# Patient Record
Sex: MALE | Race: WHITE
[De-identification: names, ages, dates, MRNs, and addresses within clinical notes are randomized per-mention and may not be internally consistent; named-entity substitution may affect disease eponyms.]

---

## 2017-10-06 ENCOUNTER — HOSPITAL ENCOUNTER (OUTPATIENT)
Dept: HOSPITAL 17 - HEND | Age: 62
End: 2017-10-06
Attending: INTERNAL MEDICINE
Payer: MEDICARE

## 2017-10-06 VITALS — WEIGHT: 197.98 LBS | BODY MASS INDEX: 31.82 KG/M2 | HEIGHT: 66 IN

## 2017-10-06 VITALS
DIASTOLIC BLOOD PRESSURE: 79 MMHG | HEART RATE: 99 BPM | SYSTOLIC BLOOD PRESSURE: 147 MMHG | OXYGEN SATURATION: 94 % | RESPIRATION RATE: 18 BRPM | TEMPERATURE: 97.8 F

## 2017-10-06 DIAGNOSIS — I10: ICD-10-CM

## 2017-10-06 DIAGNOSIS — E11.9: ICD-10-CM

## 2017-10-06 DIAGNOSIS — K80.50: Primary | ICD-10-CM

## 2017-10-06 DIAGNOSIS — J44.9: ICD-10-CM

## 2017-10-06 DIAGNOSIS — K74.60: ICD-10-CM

## 2017-10-06 DIAGNOSIS — Z01.810: ICD-10-CM

## 2017-10-06 PROCEDURE — 93005 ELECTROCARDIOGRAM TRACING: CPT

## 2017-10-06 PROCEDURE — 74330 X-RAY BILE/PANC ENDOSCOPY: CPT

## 2017-10-06 PROCEDURE — C1769 GUIDE WIRE: HCPCS

## 2017-10-06 NOTE — RADRPT
EXAM DATE/TIME:  10/06/2017 15:00 

 

[facility] COMPARISON:     

No previous studies available for comparison.

                     

 

INDICATIONS :     

Obstruction, stones. Sphincterotomy.

                     

 

FLUORO TIME:      

1.85 minutes

 

IMAGE COUNT:       

4

                     

 

CONTRAST:     

Instilled by Ordering Physician

                     

 

MEDICAL HISTORY :            

Unobtainable.   

 

SURGICAL HISTORY :        

Unobtainable.

 

ENCOUNTER:     

Initial                                        

 

ACUITY:     

1 day      

 

PAIN SCORE:     

Non-responsive.

 

LOCATION:       

Abdomen. 

 

FINDINGS:     

An ERCP was performed by the ordering physician.  

 

The images demonstrate dilated common bile duct with stone in the distal duct. Sphincterotomy and rem
oval of stone

 

CONCLUSION:     ERCP as above.

 

 

 

 Bharat Leblanc MD on October 06, 2017 at 15:54           

Board Certified Radiologist.

 This report was verified electronically.

## 2017-10-06 NOTE — GIPROC
[facility]

[address] [facility]. [facility], [address]

 

 

ERCP PROCEDURE REPORT        EXAM DATE: 10/06/2017

 

PATIENT NAME:      James Lind           MR #:      [id number]

YOB: 1955      VISIT #:     [id number]

ATTENDING:     Misael Adamson MD     ORDER #:     [id number]

ASSISTANT:      Allyson Kirk and Rush Mueller     STATUS:     outpatient

 

INDICATIONS:  The patient is a 62 yr old male here for an ERCP due to

choledocholithiasis base on MRI this past March. He has cirrhosis and takes

propranolol, 20mg/coleman.

PROCEDURE PERFORMED:     ERCP/sphincterotomy/balloon pull-through

 

MEDICATIONS:     Per Anesthesia.  general endotracheal intubation.

 

CONSENT: The patient understands the risks and benefits of the procedure and

understands that these risks include, but are not limited to: sedation,

allergic reaction, infection, perforation and/or bleeding. Alternative means of

evaluation and treatment include, among others: physical exam, x-rays, and/or

surgical intervention. The patient elects to proceed with this endoscopic

procedure.

 



medical equipment was checked for proper function. Hand hygiene and appropriate

measures for infection prevention was taken. After the risks, benefits and

alternatives of the procedure were thoroughly explained, Informed was verified,

confirmed and timeout was successfully executed by the treatment team. With the

patient in left semi-prone position, medications were administered

intravenously.The Pentax [id number] was passed from the mouth into the

esophagus and further advanced from the esophagus into the stomach. From

stomach scope was directed to the ampulla of Vater     .  Major papilla was

aligned with the duodenoscope. The scope position was confirmed

fluoroscopically. Rest of the findings/therapeutics are given below. The scope

was then completely withdrawn from the patient and the procedure completed. The

pulse, BP, and O2 saturation were monitored and documented by the physician and

the nursing staff throughout the entire procedure. The patient was cared for as

planned according to standard protocol. The patient was then discharged to

recovery in stable condition and with appropriate post procedure care.

 

The esophagus appeared  normal with no varices.  The Z-line appeared normal at

the GE junction ,at 40cm from the incisors.

Prominent veins were noted in the fundus of the stomach, but none at the cardia.

Superficial punctate antral erosions were noted.

The pylorus, duodenal bulb and sweep appeared normal.

The ampulla bulged a bit.

Cholangiogram revealed a dilated extrahepatic biliary tree (up to 1 6mm) with no

significant intrahepatic dilation    No filling defects were noted.  A 7mm

sphincterotomy was made and a 15mm balloon was inflated in the proximal bile

duct and pulled distally.  The balloonw was partially deflated and withdrawn

via the ampulla.  Good drainage was noted with no stones or sludge extracted.

 

 

 

 

ADVERSE EVENT:     There were no complications.

IMPRESSIONS:     As above

 

RECOMMENDATIONS:     D/C home when anesthesia criteria are met. Resume prior

diet and medications

Follow up with me in 4 weeks with a labs checked prior to visit.

REPEAT EXAM:     Repeat EGD in one year.

 

 

___________________________________

Misael Adamson MD

eSigned:  Misael Adamson MD 10/06/2017 3:22 PM

 

 

cc: Anum Abel M.D.

 

 

 

PATIENT NAME:  James Lind

MR#: [id number]

## 2017-10-07 NOTE — EKG
Date Performed: 10/06/2017       Time Performed: 11:24:36

 

PTAGE:      62 years

 

EKG:      Sinus rhythm 

 

 NORMAL ECG

 

PREVIOUS TRACING       : 11/04/2014 11.32

 

DOCTOR:   Brian Lawler  Interpretating Date/Time  10/07/2017 05:15:06

## 2018-04-25 ENCOUNTER — HOSPITAL ENCOUNTER (EMERGENCY)
Dept: HOSPITAL 17 - PHEFT | Age: 63
Discharge: HOME | End: 2018-04-25
Payer: MEDICARE

## 2018-04-25 VITALS — HEIGHT: 67 IN | BODY MASS INDEX: 31.63 KG/M2 | WEIGHT: 201.5 LBS

## 2018-04-25 VITALS
OXYGEN SATURATION: 94 % | SYSTOLIC BLOOD PRESSURE: 142 MMHG | RESPIRATION RATE: 18 BRPM | DIASTOLIC BLOOD PRESSURE: 96 MMHG | HEART RATE: 94 BPM | TEMPERATURE: 97.7 F

## 2018-04-25 VITALS — DIASTOLIC BLOOD PRESSURE: 89 MMHG | SYSTOLIC BLOOD PRESSURE: 159 MMHG

## 2018-04-25 VITALS — RESPIRATION RATE: 18 BRPM

## 2018-04-25 DIAGNOSIS — Z23: ICD-10-CM

## 2018-04-25 DIAGNOSIS — S61.411A: Primary | ICD-10-CM
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
## 2018-04-25 PROCEDURE — 90714 TD VACC NO PRESV 7 YRS+ IM: CPT

## 2018-04-25 PROCEDURE — 12001 RPR S/N/AX/GEN/TRNK 2.5CM/<: CPT

## 2018-04-25 PROCEDURE — 73130 X-RAY EXAM OF HAND: CPT

## 2018-04-25 PROCEDURE — 90471 IMMUNIZATION ADMIN: CPT

## 2018-04-25 NOTE — RADRPT
EXAM DATE/TIME:  04/25/2018 21:47 

 

[facility] COMPARISON:     

No previous studies available for comparison.

 

                     

INDICATIONS :     

Right hand laceration after saw injury.

                     

 

MEDICAL HISTORY :     

None.          

 

SURGICAL HISTORY :     

None.   

 

ENCOUNTER:     

Initial                                        

 

ACUITY:     

1 day      

 

PAIN SCORE:     

10/10

 

LOCATION:     

Right  hand, 3rd-5th digits.

 

FINDINGS:     

Soft tissue defects are seen of the long, ring and little fingers. No fracture. I also don't see a ra
diopaque foreign body of these digits but a tiny radiopaque foreign body is seen volar soft tissues o
f the pointer finger at the level of the mid to distal proximal phalanx.

 

CONCLUSION:     

No fracture or other acute bony abnormality. Tiny radiopaque debris of the pointer finger soft tissue
s.

 

 

 

 Ned Worrell MD on April 25, 2018 at 22:05           

Board Certified Radiologist.

 This report was verified electronically.

## 2018-04-25 NOTE — PD
HPI


Chief Complaint:  Laceration/Skin Injury


Time Seen by Provider:  21:23


Travel History


International Travel<30 days:  No


Contact w/Intl Traveler<30days:  No


Traveled to known affect area:  No





History of Present Illness


HPI


Is a 63-year-old man presents to the emergency department complaining of 

laceration to the right hand.  Patient is right-handed.  Patient states he was 

trying to put his tools away, and had a circular saw with no guard on it that 

he thought was unplugged when he was trying to put it away the saw kicked on 

and cut his right hand at the base of his fourth and fifth fingers.  He has 

bleeding in that area.  Still has full function.  This happened just prior to 

arrival.  He otherwise had been feeling generally well and healthy.





History


Past Medical History


*** Narrative Medical


Hypertension


Diabetes


On methadone


Influenza Vaccination:  No


PNEUMOCCOCAL Vaccine (Year):  3





Social History


Alcohol Use:  No (6 beers per day)


Tobacco Use:  No (quit)





Allergies-Medications


(Allergen,Severity, Reaction):  


Coded Allergies:  


     acetaminophen (Unverified  Allergy, Severe, CANNOT TAKE DUE TO LIVER 

FAILURE, 4/25/18)


     aspirin (Unverified  Allergy, Severe, CANNOT TAKE - LIVER FAILURE, 4/25/18)


     codeine (Unverified  Allergy, Severe, Hives, 4/25/18)


     ibuprofen (Unverified  Allergy, Severe, CANNOT TAKE DUE TO LIVER FAILURE, 4 /25/18)


     pregabalin (Unverified  Allergy, Severe, Psychosis, 4/25/18)


Reported Meds & Prescriptions





Reported Meds & Active Scripts


Active


Reported


[dibetes med]     


Methadone (Methadone HCl) 10 Mg Tab 30 Mg PO BID


Lasix (Furosemide) 40 Mg Tab 40 Mg PO DAILY


Propranolol (Propranolol HCl) 20 Mg Tab 20 Mg PO DAILY


Adderall (Amphetamine-Dextroamphetamine) 20 Mg Tab 10 Mg PO DAILY


     Avoid late evening doses. Space doses at least 4 to 6 hours if more


     than once/day dosing.








Review of Systems


Except as stated in HPI:  all other systems reviewed are Neg





Physical Exam


Narrative


GENERAL: 63-year-old man, well-appearing, no acute distress.


SKIN: Warm and dry.


CARDIOVASCULAR: Warm and well perfused.


RESPIRATORY: Normal rate and effort.


MUSCULOSKELETAL: Focused examination of the right hand reveals lacerations on 

the volar surface of the base of the third fourth and fifth fingers.  On the 

fifth finger there is multiple on the fourth finger there is a large avulsion 

and a flap with exposed tendon on the proximal phalanx on the same side.  On 

the third digit there are several smaller lacerations on the proximal and mid 

phalanx.  There is full range of motion of the hand.  All the flexor tendons 

appear to be functioning.  There is some loss of soft tissue on the fourth and 

fifth finger.  Distally he has good perfusion.  Sensations intact distally.


NEUROLOGICAL: Awake and alert.  No gross deficits.





Data


Data


Last Documented VS





Vital Signs








  Date Time  Temp Pulse Resp B/P (MAP) Pulse Ox O2 Delivery O2 Flow Rate FiO2


 


4/25/18 21:17 97.7 94 18 142/96 (111) 94   








Orders





 Orders


Hand, Complete ([id number]) (4/25/18 )


Lidocaine 1% Inj (Xylocaine 1% Inj) (4/25/18 21:45)


Bupivacaine Pf 0.25% Inj (Marcaine Pf 0. (4/25/18 21:45)


Morphine Ir (Msir) (4/25/18 21:45)


Tetanus/Diphtheria Tox Adult (Tetanus/Di (4/25/18 21:45)


Cephalexin (Keflex) (4/25/18 21:45)


Bupivacaine Pf 0.25% Inj (Marcaine Pf 0. (4/25/18 22:00)


Lidocaine 1% Inj (Xylocaine 1% Inj) (4/25/18 21:50)








MDM


Medical Decision Making


Medical Screen Exam Complete:  Yes


Emergency Medical Condition:  Yes


Interpretation(s)


X-ray negative for fracture.


Differential Diagnosis


Laceration to the right hand, flexor tendon injury to the right hand, bony 

injury to the right hand, other


Narrative Course


Medical decision making





63-year-old male complex injury right hand from a circular saw.  Some soft 

tissue avulsion on the fourth finger, multiple deep lacerations on the fifth, 

smaller more superficial lacerations on the third.  Will block the fingers, 

check x-ray, tetanus, antibiotics, follow-up with hand surgery.





Procedures


**Procedure Narrative**


Laceration repair:


Finger 3 4 and 5 blocked with lidocaine and quarter percent bupivacaine.  There 

is washout with sterile saline and Betadine.  Multiple sutures were used to 

loosely close the macerated soft tissue.  Patient tolerated well.





Diagnosis





 Primary Impression:  


 Hand laceration


Referrals:  


Christiano Jorgensen MD


2 days





***Additional Instructions:  


Take Keflex as prescribed.





Follow-up with the plastic surgeon as discussed.





Return to the emergency department for any n worsening pain redness or swelling

, or any other new or worsening symptoms.


***Med/Other Pt SpecificInfo:  Prescription(s) given


Scripts


Morphine IR (Morphine IR) 15 Mg Tab


15 MG PO Q6HR Y for PAIN, #20 TAB 0 Refills


   Prov: Last Hahn MD         4/25/18 


Cephalexin (Keflex) 500 Mg Cap


500 MG PO Q8H for Infection, #30 CAP 0 Refills


   Prov: Last Hahn MD         4/25/18


Disposition:  01 DISCHARGE HOME


Condition:  Stable











Last Hahn MD Apr 25, 2018 21:58

## 2018-05-12 ENCOUNTER — HOSPITAL ENCOUNTER (EMERGENCY)
Dept: HOSPITAL 17 - NEPE | Age: 63
LOS: 1 days | Discharge: HOME | End: 2018-05-13
Payer: MEDICARE

## 2018-05-12 VITALS — WEIGHT: 199.41 LBS | BODY MASS INDEX: 29.53 KG/M2 | HEIGHT: 69 IN

## 2018-05-12 VITALS
RESPIRATION RATE: 16 BRPM | HEART RATE: 72 BPM | OXYGEN SATURATION: 95 % | TEMPERATURE: 98 F | DIASTOLIC BLOOD PRESSURE: 101 MMHG | SYSTOLIC BLOOD PRESSURE: 216 MMHG

## 2018-05-12 VITALS
DIASTOLIC BLOOD PRESSURE: 93 MMHG | SYSTOLIC BLOOD PRESSURE: 199 MMHG | HEART RATE: 71 BPM | RESPIRATION RATE: 16 BRPM | OXYGEN SATURATION: 95 %

## 2018-05-12 DIAGNOSIS — F41.8: ICD-10-CM

## 2018-05-12 DIAGNOSIS — Z79.84: ICD-10-CM

## 2018-05-12 DIAGNOSIS — I86.4: ICD-10-CM

## 2018-05-12 DIAGNOSIS — Z87.442: ICD-10-CM

## 2018-05-12 DIAGNOSIS — K70.30: Primary | ICD-10-CM

## 2018-05-12 DIAGNOSIS — B19.20: ICD-10-CM

## 2018-05-12 DIAGNOSIS — Z88.5: ICD-10-CM

## 2018-05-12 DIAGNOSIS — E11.9: ICD-10-CM

## 2018-05-12 DIAGNOSIS — J44.9: ICD-10-CM

## 2018-05-12 DIAGNOSIS — Z88.6: ICD-10-CM

## 2018-05-12 LAB
BASOPHILS # BLD AUTO: 0 TH/MM3 (ref 0–0.2)
BASOPHILS NFR BLD: 0.6 % (ref 0–2)
EOSINOPHIL # BLD: 0.3 TH/MM3 (ref 0–0.4)
EOSINOPHIL NFR BLD: 4 % (ref 0–4)
ERYTHROCYTE [DISTWIDTH] IN BLOOD BY AUTOMATED COUNT: 14.2 % (ref 11.6–17.2)
HCT VFR BLD CALC: 42.4 % (ref 39–51)
HGB BLD-MCNC: 14.5 GM/DL (ref 13–17)
LYMPHOCYTES # BLD AUTO: 1.9 TH/MM3 (ref 1–4.8)
LYMPHOCYTES NFR BLD AUTO: 25.7 % (ref 9–44)
MCH RBC QN AUTO: 33.7 PG (ref 27–34)
MCHC RBC AUTO-ENTMCNC: 34.3 % (ref 32–36)
MCV RBC AUTO: 98.4 FL (ref 80–100)
MONOCYTE #: 1 TH/MM3 (ref 0–0.9)
MONOCYTES NFR BLD: 13.4 % (ref 0–8)
NEUTROPHILS # BLD AUTO: 4.2 TH/MM3 (ref 1.8–7.7)
NEUTROPHILS NFR BLD AUTO: 56.3 % (ref 16–70)
PLATELET # BLD: 205 TH/MM3 (ref 150–450)
PMV BLD AUTO: 8.5 FL (ref 7–11)
RBC # BLD AUTO: 4.31 MIL/MM3 (ref 4.5–5.9)
WBC # BLD AUTO: 7.4 TH/MM3 (ref 4–11)

## 2018-05-12 PROCEDURE — 96360 HYDRATION IV INFUSION INIT: CPT

## 2018-05-12 PROCEDURE — 99285 EMERGENCY DEPT VISIT HI MDM: CPT

## 2018-05-12 PROCEDURE — 80053 COMPREHEN METABOLIC PANEL: CPT

## 2018-05-12 PROCEDURE — 83690 ASSAY OF LIPASE: CPT

## 2018-05-12 PROCEDURE — 85025 COMPLETE CBC W/AUTO DIFF WBC: CPT

## 2018-05-12 PROCEDURE — 74176 CT ABD & PELVIS W/O CONTRAST: CPT

## 2018-05-12 PROCEDURE — 93005 ELECTROCARDIOGRAM TRACING: CPT

## 2018-05-12 PROCEDURE — 83605 ASSAY OF LACTIC ACID: CPT

## 2018-05-13 VITALS — OXYGEN SATURATION: 96 %

## 2018-05-13 LAB
ALBUMIN SERPL-MCNC: 3.1 GM/DL (ref 3.4–5)
ALP SERPL-CCNC: 216 U/L (ref 45–117)
ALT SERPL-CCNC: 23 U/L (ref 12–78)
AST SERPL-CCNC: 39 U/L (ref 15–37)
BILIRUB SERPL-MCNC: 1.2 MG/DL (ref 0.2–1)
BUN SERPL-MCNC: 15 MG/DL (ref 7–18)
CALCIUM SERPL-MCNC: 9.2 MG/DL (ref 8.5–10.1)
CHLORIDE SERPL-SCNC: 106 MEQ/L (ref 98–107)
CREAT SERPL-MCNC: 0.73 MG/DL (ref 0.6–1.3)
GFR SERPLBLD BASED ON 1.73 SQ M-ARVRAT: 108 ML/MIN (ref 89–?)
GLUCOSE SERPL-MCNC: 114 MG/DL (ref 74–106)
HCO3 BLD-SCNC: 27.4 MEQ/L (ref 21–32)
PROT SERPL-MCNC: 8.1 GM/DL (ref 6.4–8.2)
SODIUM SERPL-SCNC: 143 MEQ/L (ref 136–145)

## 2018-05-13 NOTE — EKG
Date Performed: 05/13/2018       Time Performed: 00:24:40

 

PTAGE:      63 years

 

EKG:      Sinus rhythm 

 

 NORMAL ECG

 

PREVIOUS TRACING       : 10/06/2017 11.24 Compared to previous tracing,  rate slower

 

DOCTOR:   Gifty Ruvalcaba  Interpretating Date/Time  05/13/2018 17:10:45

## 2018-05-13 NOTE — PD
HPI


Chief Complaint:  Flank/Kidney Pain


Time Seen by Provider:  23:26


Travel History


International Travel<30 days:  No


Contact w/Intl Traveler<30days:  No


Traveled to known affect area:  No





History of Present Illness


HPI


63-year-old male arrives with complaint of flank pain.  Pain is constant.  No 

vomiting.  Pain is rated to be 9/10.  The pain is worse with palpation.  He has 

a history of kidney stones.  He states the pain is radiating to the anterior 

abdominal wall. No hematuria. Duration < 1 day.





PFSH


Past Medical History


Arthritis:  No


Asthma:  No


Autoimmune Disease:  Yes (hep c)


Anxiety:  Yes


Depression:  Yes


Heart Rhythm Problems:  No


Cancer:  No


Cardiovascular Problems:  Yes


High Cholesterol:  No


Chest Pain:  Yes


Congestive Heart Failure:  No


Cirrhosis:  Yes


COPD:  Yes


Cerebrovascular Accident:  No


Diabetes:  Yes


Patient Takes Glucophage:  No


Diminished Hearing:  No


Endocrine:  No


Gastrointestinal Disorders:  Yes (liver disease)


GERD:  No


Genitourinary:  No


Hepatitis:  Yes (HEP C)


Hiatal Hernia:  No


Hypertension:  Yes (takes no meds)


Implanted Vascular Access Dvce:  Yes


Kidney Stones:  No


Musculoskeletal:  Yes (CHRONIC BACK PAIN)


Neurologic:  No


Psychiatric:  Yes (Bipolar d/o per patient)


Reproductive:  No


Respiratory:  No


Immunizations Current:  No


Seizures:  No


Sleep Apnea:  No


Thyroid Disease:  No


Ulcer:  No


PNEUMOCCOCAL Vaccine (Year):  3





Past Surgical History


Abdominal Surgery:  No


Appendectomy:  No


Body Medical Devices:  HEP C; PORTAL HTN, METAL PLATE IN LEFT CHEEK(PLASTIC 

SURGERY)


Cardiac Surgery:  No


Cholecystectomy:  No


Endocrine Surgery:  No


Eye Surgery:  No


Genitourinary Surgery:  No


Gynecologic Surgery:  No


Neurologic Surgery:  Yes (LAMINECTOMY)


Oral Surgery:  No


Thoracic Surgery:  No


Other Surgery:  Yes (PLASTIC RECONSTRUCTIVE SURGERY WT FACIAL PLATES  )





Social History


Alcohol Use:  No (6 beers per day)


Tobacco Use:  No (quit)


Substance Use:  No





Allergies-Medications


(Allergen,Severity, Reaction):  


Coded Allergies:  


     acetaminophen (Unverified  Allergy, Severe, CANNOT TAKE DUE TO LIVER 

FAILURE, 5/12/18)


     aspirin (Unverified  Allergy, Severe, CANNOT TAKE - LIVER FAILURE, 5/12/18)


     codeine (Unverified  Allergy, Severe, Hives, 5/12/18)


     ibuprofen (Unverified  Allergy, Severe, CANNOT TAKE DUE TO LIVER FAILURE, 5 /12/18)


     pregabalin (Unverified  Allergy, Severe, Psychosis, 5/12/18)


Reported Meds & Prescriptions





Reported Meds & Active Scripts


Active


Keflex (Cephalexin) 500 Mg Cap 500 Mg PO Q8H


Reported


Amlodipine (Amlodipine Besylate) 10 Mg Tab 10 Mg PO DAILY


[dibetes med]     


Adderall (Amphetamine-Dextroamphetamine) 20 Mg Tab 10 Mg PO DAILY


     Avoid late evening doses. Space doses at least 4 to 6 hours if more


     than once/day dosing.








Review of Systems


Except as stated in HPI:  all other systems reviewed are Neg


General / Constitutional:  No: Fever





Physical Exam


Narrative


GENERAL: 63-year-old male well-nourished well-developed mild distress





Vital Signs








  Date Time  Temp Pulse Resp B/P (MAP) Pulse Ox O2 Delivery O2 Flow Rate FiO2


 


5/13/18 00:06     96 Room Air  


 


5/12/18 23:05  71 16 199/93 (128) 95 Room Air  


 


5/12/18 22:57 98.0 72 16 216/101 (139) 95   








SKIN: Warm and dry.


HEAD: Atraumatic. Normocephalic. 


EYES: Pupils equal and round. No scleral icterus. No injection or drainage. 


ENT: No nasal bleeding or discharge.  Mucous membranes pink and moist.


NECK: Trachea midline. No JVD. 


CARDIOVASCULAR: Regular rate and rhythm.  


RESPIRATORY: No accessory muscle use. Clear to auscultation. Breath sounds 

equal bilaterally. 


GASTROINTESTINAL: Soft.  Generalized nonspecific tenderness.  No significant 

flank tenderness.


MUSCULOSKELETAL: Extremities without clubbing, cyanosis, or edema. No obvious 

deformities. 


NEUROLOGICAL: Awake and alert. No obvious cranial nerve deficits.  Motor 

grossly within normal limits. Five out of 5 muscle strength in the arms and 

legs.  Normal speech.


PSYCHIATRIC: Appropriate mood and affect; insight and judgment normal.





Data


Data


Last Documented VS





Vital Signs








  Date Time  Temp Pulse Resp B/P (MAP) Pulse Ox O2 Delivery O2 Flow Rate FiO2


 


5/13/18 00:06     96 Room Air  


 


5/12/18 23:05  71 16     


 


5/12/18 22:57 98.0       








Orders





 Orders


Complete Blood Count With Diff (5/12/18 23:26)


Comprehensive Metabolic Panel (5/12/18 23:26)


Lipase (5/12/18 23:26)


Urinalysis - C+S If Indicated (5/12/18 23:26)


Iv Access Insert/Monitor (5/12/18 23:26)


Ecg Monitoring (5/12/18 23:26)


Oximetry (5/12/18 23:26)


Sodium Chloride 0.9% Flush (Ns Flush) (5/12/18 23:30)


Electrocardiogram (5/12/18 23:26)


Lactic Acid (5/12/18 23:26)


Ct Abd/Pel W/O Iv Contrast (5/12/18 23:26)


Sodium Chlor 0.9% 1000 Ml Inj (Ns 1000 M (5/12/18 23:26)


Ondansetron  Odt (Zofran  Odt) (5/12/18 23:30)





Labs





Laboratory Tests








Test


  5/12/18


23:41 5/12/18


23:59


 


White Blood Count 7.4 TH/MM3  


 


Red Blood Count 4.31 MIL/MM3  


 


Hemoglobin 14.5 GM/DL  


 


Hematocrit 42.4 %  


 


Mean Corpuscular Volume 98.4 FL  


 


Mean Corpuscular Hemoglobin 33.7 PG  


 


Mean Corpuscular Hemoglobin


Concent 34.3 % 


  


 


 


Red Cell Distribution Width 14.2 %  


 


Platelet Count 205 TH/MM3  


 


Mean Platelet Volume 8.5 FL  


 


Neutrophils (%) (Auto) 56.3 %  


 


Lymphocytes (%) (Auto) 25.7 %  


 


Monocytes (%) (Auto) 13.4 %  


 


Eosinophils (%) (Auto) 4.0 %  


 


Basophils (%) (Auto) 0.6 %  


 


Neutrophils # (Auto) 4.2 TH/MM3  


 


Lymphocytes # (Auto) 1.9 TH/MM3  


 


Monocytes # (Auto) 1.0 TH/MM3  


 


Eosinophils # (Auto) 0.3 TH/MM3  


 


Basophils # (Auto) 0.0 TH/MM3  


 


CBC Comment DIFF FINAL  


 


Differential Comment   


 


Blood Urea Nitrogen 15 MG/DL  


 


Creatinine 0.73 MG/DL  


 


Random Glucose 114 MG/DL  


 


Total Protein 8.1 GM/DL  


 


Albumin 3.1 GM/DL  


 


Calcium Level 9.2 MG/DL  


 


Alkaline Phosphatase 216 U/L  


 


Aspartate Amino Transf


(AST/SGOT) 39 U/L 


  


 


 


Alanine Aminotransferase


(ALT/SGPT) 23 U/L 


  


 


 


Total Bilirubin 1.2 MG/DL  


 


Sodium Level 143 MEQ/L  


 


Potassium Level 3.9 MEQ/L  


 


Chloride Level 106 MEQ/L  


 


Carbon Dioxide Level 27.4 MEQ/L  


 


Anion Gap 10 MEQ/L  


 


Estimat Glomerular Filtration


Rate 108 ML/MIN 


  


 


 


Lipase 112 U/L  


 


Lactic Acid Level  1.2 mmol/L 











MDM


Medical Decision Making


Medical Screen Exam Complete:  Yes


Emergency Medical Condition:  Yes


Medical Record Reviewed:  Yes


Differential Diagnosis


Gastritis, pancreatitis, appendicitis, acute cholecystitis, ascending 

cholangitis, AAA, perforated viscous, mesenteric ischemia, hepatitis, cystitis, 

hydronephrosis/hydroureter/nephroureter calculus, mesenteric adenitis, biliary 

colic


Narrative Course


CBC & BMP Diagram


5/12/18 23:41








Total Protein 8.1, Albumin 3.1 L, Calcium Level 9.2, Alkaline Phosphatase 216 H

, Aspartate Amino Transf (AST/SGOT) 39 H, Alanine Aminotransferase (ALT/SGPT) 23

, Total Bilirubin 1.2 H





We see esophageal varices and a cirrhotic liver on CT.  The imaging is 

otherwise unremarkable with no evidence of hydronephrosis on the left side.


Workup is essentially unremarkable.  Pain controlled.  Patient will be 

discharged home.





Diagnosis





 Primary Impression:  


 Cirrhosis


 Qualified Codes:  K70.30 - Alcoholic cirrhosis of liver without ascites


 Additional Impressions:  


 Varices, gastric


 Abdominal pain


 Qualified Codes:  R10.9 - Unspecified abdominal pain


Referrals:  


Primary Care Physician


2 days


***Med/Other Pt SpecificInfo:  No Change to Meds


Disposition:  01 DISCHARGE HOME


Condition:  Stable











James Sun MD May 13, 2018 00:20

## 2018-05-18 ENCOUNTER — HOSPITAL ENCOUNTER (EMERGENCY)
Age: 63
Discharge: HOME | End: 2018-05-18
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
## 2018-05-18 DIAGNOSIS — S61.411D: ICD-10-CM

## 2018-05-18 DIAGNOSIS — Z86.59: ICD-10-CM

## 2018-05-18 DIAGNOSIS — E11.9: ICD-10-CM

## 2018-05-18 DIAGNOSIS — K74.60: ICD-10-CM

## 2018-05-18 DIAGNOSIS — L08.9: Primary | ICD-10-CM

## 2018-05-18 DIAGNOSIS — Z87.39: ICD-10-CM

## 2018-05-18 DIAGNOSIS — J44.9: ICD-10-CM

## 2018-05-18 DIAGNOSIS — I10: ICD-10-CM
